# Patient Record
Sex: FEMALE
[De-identification: names, ages, dates, MRNs, and addresses within clinical notes are randomized per-mention and may not be internally consistent; named-entity substitution may affect disease eponyms.]

---

## 2020-11-24 ENCOUNTER — TRANSCRIPTION ENCOUNTER (OUTPATIENT)
Age: 60
End: 2020-11-24

## 2020-12-19 ENCOUNTER — TRANSCRIPTION ENCOUNTER (OUTPATIENT)
Age: 60
End: 2020-12-19

## 2020-12-26 ENCOUNTER — TRANSCRIPTION ENCOUNTER (OUTPATIENT)
Age: 60
End: 2020-12-26

## 2021-02-11 PROBLEM — Z00.00 ENCOUNTER FOR PREVENTIVE HEALTH EXAMINATION: Status: ACTIVE | Noted: 2021-02-11

## 2021-02-12 ENCOUNTER — APPOINTMENT (OUTPATIENT)
Dept: ORTHOPEDIC SURGERY | Facility: CLINIC | Age: 61
End: 2021-02-12
Payer: COMMERCIAL

## 2021-02-12 VITALS
BODY MASS INDEX: 20.24 KG/M2 | SYSTOLIC BLOOD PRESSURE: 101 MMHG | WEIGHT: 110 LBS | HEIGHT: 62 IN | DIASTOLIC BLOOD PRESSURE: 64 MMHG | HEART RATE: 71 BPM

## 2021-02-12 DIAGNOSIS — Z78.9 OTHER SPECIFIED HEALTH STATUS: ICD-10-CM

## 2021-02-12 DIAGNOSIS — G57.61 LESION OF PLANTAR NERVE, RIGHT LOWER LIMB: ICD-10-CM

## 2021-02-12 PROCEDURE — 99204 OFFICE O/P NEW MOD 45 MIN: CPT

## 2021-02-12 PROCEDURE — 99072 ADDL SUPL MATRL&STAF TM PHE: CPT

## 2021-02-12 PROCEDURE — 73630 X-RAY EXAM OF FOOT: CPT | Mod: LT

## 2021-02-12 NOTE — PHYSICAL EXAM
[de-identified] : General: Alert and oriented x3. In no acute distress. Pleasant in nature with a normal affect. No apparent respiratory distress. \par \par L Foot Exam\par Skin: Clean, dry, intact\par Inspection: No obvious malalignment, no masses, no swelling, no effusion\par Pulses: 2+ DP/PT pulses \par ROM: FOOT Full ROM of digits, ANKLE 10 degrees of dorsiflexion, 40 degrees of plantarflexion, 10 degrees of subtalar motion. \par Painful ROM: None \par Tenderness: +3rd webspace. No tenderness over the medial malleolus, No tenderness over the lateral malleolus, no CFL/ATFL/PTFL pain, no deltoid ligament pain. No heel pain. No Achilles tenderness. No 5th metatarsal pain. No pain to the LisFranc joint. No ttp over the posterior tibial tendon.\par Stability: Negative anterior/posterior drawer.\par Strength: 5/5 ADD/ABD/TA/GS/EHL/FHL/EDL\par Neuro: Sensation in tact to light touch throughout\par Additional tests: +Mortons test, negative tarsal tunnel tinels, negative single heel rise. \par \par R Foot Exam\par Skin: Clean, dry, intact\par Inspection: No obvious malalignment, no masses, no swelling, no effusion\par Pulses: 2+ DP/PT pulses \par ROM: FOOT Full ROM of digits, ANKLE 10 degrees of dorsiflexion, 40 degrees of plantarflexion, 10 degrees of subtalar motion. \par Painful ROM: None \par Tenderness: +4th metatarsal. +3rd plantar MTP. No tenderness over the medial malleolus, No tenderness over the lateral malleolus, no CFL/ATFL/PTFL pain, no deltoid ligament pain. No heel pain. No Achilles tenderness. No 5th metatarsal pain. No pain to the LisFranc joint. No ttp over the posterior tibial tendon. \par Stability: Negative anterior/posterior drawer.\par Strength: 5/5 ADD/ABD/TA/GS/EHL/FHL/EDL\par Neuro: Sensation in tact to light touch throughout\par Additional tests: +Mortons test, negative tarsal tunnel tinels, negative single heel rise.  [de-identified] : 3V of the bilateral feet were ordered, obtained and reviewed by me today, 02/12/2021, revealed: No acute fracture.

## 2021-02-12 NOTE — DISCUSSION/SUMMARY
[de-identified] : Today I had a lengthy discussion with the patient regarding her bilateral foot pain. I have addressed all the patient's concerns surrounding the pathology of her condition. At this time I would like to obtain advanced imaging of the patient's right foot. An MRI was ordered so I can find out more about the etiology of the patient's condition. The patient should follow up with the office after obtaining the MRI. The patient understood and verbally agreed to the treatment plan. All of her questions were answered and she was satisfied with the visit. The patient should call the office if she has any questions or experience worsening symptoms.

## 2021-02-12 NOTE — ADDENDUM
[FreeTextEntry1] : I, Parker Rosales, acted solely as a scribe for Dr. Brenden Oliva on this date 02/12/2021. \par All medical record entries made by the Scribe were at my, Dr. Brenden Oliva, direction and personally dictated by me on 02/12/2021 . I have reviewed the chart and agree that the record accurately reflects my personal performance of the history, physical exam, assessment and plan. I have also personally directed, reviewed, and agreed with the chart.

## 2021-02-12 NOTE — HISTORY OF PRESENT ILLNESS
[FreeTextEntry1] : Ms. GARCIA is a 60 year old female presents for evaluation of a prior diagnosed Walters's Neuroma, right foot much worse than left. She notes she has had pain to bilateral feet for more than 1.5 years, and has been previously under the care of a podiatrist. She localizes the pain to the plantar surface of the foot and describes a stabbing pain over the 3rd webspace. She reports pain when walking, and wearing a heel soled shoe. She admits to prior treatment including multiple cortisone injections into the 3rd webspace of the right foot with Dr. Gallo Argueta. The last injection was over a year ago, with subjective worsening of her symptoms. She denies a left foot injection. She presents today as she is interested in surgical intervention. She denies any prior imaging to date. She denies use of medications for pain.

## 2022-03-25 ENCOUNTER — RESULT REVIEW (OUTPATIENT)
Age: 62
End: 2022-03-25

## 2022-09-09 ENCOUNTER — NON-APPOINTMENT (OUTPATIENT)
Age: 62
End: 2022-09-09